# Patient Record
Sex: MALE | Race: WHITE | NOT HISPANIC OR LATINO | Employment: OTHER | URBAN - METROPOLITAN AREA
[De-identification: names, ages, dates, MRNs, and addresses within clinical notes are randomized per-mention and may not be internally consistent; named-entity substitution may affect disease eponyms.]

---

## 2023-04-05 LAB — SARS-COV-2 RESULT: NOT DETECTED

## 2023-04-06 ENCOUNTER — HOSPITAL ENCOUNTER (OUTPATIENT)
Dept: DATA CONVERSION | Facility: HOSPITAL | Age: 63
End: 2023-04-07
Attending: ORTHOPAEDIC SURGERY | Admitting: ORTHOPAEDIC SURGERY
Payer: COMMERCIAL

## 2023-04-06 DIAGNOSIS — Z96.659 PRESENCE OF UNSPECIFIED ARTIFICIAL KNEE JOINT: ICD-10-CM

## 2023-04-06 DIAGNOSIS — E78.5 HYPERLIPIDEMIA, UNSPECIFIED: ICD-10-CM

## 2023-04-06 DIAGNOSIS — M10.9 GOUT, UNSPECIFIED: ICD-10-CM

## 2023-04-06 DIAGNOSIS — I50.30 UNSPECIFIED DIASTOLIC (CONGESTIVE) HEART FAILURE (MULTI): ICD-10-CM

## 2023-04-06 DIAGNOSIS — Z79.01 LONG TERM (CURRENT) USE OF ANTICOAGULANTS: ICD-10-CM

## 2023-04-06 DIAGNOSIS — I11.0 HYPERTENSIVE HEART DISEASE WITH HEART FAILURE (MULTI): ICD-10-CM

## 2023-04-06 DIAGNOSIS — M17.11 UNILATERAL PRIMARY OSTEOARTHRITIS, RIGHT KNEE: ICD-10-CM

## 2023-04-06 DIAGNOSIS — Z79.84 LONG TERM (CURRENT) USE OF ORAL HYPOGLYCEMIC DRUGS: ICD-10-CM

## 2023-04-06 DIAGNOSIS — Z87.891 PERSONAL HISTORY OF NICOTINE DEPENDENCE: ICD-10-CM

## 2023-04-06 DIAGNOSIS — E11.51 TYPE 2 DIABETES MELLITUS WITH DIABETIC PERIPHERAL ANGIOPATHY WITHOUT GANGRENE (MULTI): ICD-10-CM

## 2023-04-06 DIAGNOSIS — K58.9 IRRITABLE BOWEL SYNDROME WITHOUT DIARRHEA: ICD-10-CM

## 2023-04-06 DIAGNOSIS — I48.20 CHRONIC ATRIAL FIBRILLATION, UNSPECIFIED (MULTI): ICD-10-CM

## 2023-04-06 LAB
POCT GLUCOSE: 137 MG/DL (ref 74–99)
POCT GLUCOSE: 145 MG/DL (ref 74–99)
POCT GLUCOSE: 165 MG/DL (ref 74–99)
POCT GLUCOSE: 209 MG/DL (ref 74–99)

## 2023-04-07 LAB
ANION GAP IN SER/PLAS: 12 MMOL/L (ref 10–20)
BASOPHILS (10*3/UL) IN BLOOD BY AUTOMATED COUNT: 0.04 X10E9/L (ref 0–0.1)
BASOPHILS/100 LEUKOCYTES IN BLOOD BY AUTOMATED COUNT: 0.4 % (ref 0–2)
CALCIUM (MG/DL) IN SER/PLAS: 8.8 MG/DL (ref 8.6–10.3)
CARBON DIOXIDE, TOTAL (MMOL/L) IN SER/PLAS: 27 MMOL/L (ref 21–32)
CHLORIDE (MMOL/L) IN SER/PLAS: 102 MMOL/L (ref 98–107)
CREATININE (MG/DL) IN SER/PLAS: 0.9 MG/DL (ref 0.5–1.3)
EOSINOPHILS (10*3/UL) IN BLOOD BY AUTOMATED COUNT: 0.01 X10E9/L (ref 0–0.7)
EOSINOPHILS/100 LEUKOCYTES IN BLOOD BY AUTOMATED COUNT: 0.1 % (ref 0–6)
ERYTHROCYTE DISTRIBUTION WIDTH (RATIO) BY AUTOMATED COUNT: 13.2 % (ref 11.5–14.5)
ERYTHROCYTE MEAN CORPUSCULAR HEMOGLOBIN CONCENTRATION (G/DL) BY AUTOMATED: 33.3 G/DL (ref 32–36)
ERYTHROCYTE MEAN CORPUSCULAR VOLUME (FL) BY AUTOMATED COUNT: 90 FL (ref 80–100)
ERYTHROCYTES (10*6/UL) IN BLOOD BY AUTOMATED COUNT: 3.73 X10E12/L (ref 4.5–5.9)
GFR MALE: >90 ML/MIN/1.73M2
GLUCOSE (MG/DL) IN SER/PLAS: 114 MG/DL (ref 74–99)
HEMATOCRIT (%) IN BLOOD BY AUTOMATED COUNT: 33.6 % (ref 41–52)
HEMOGLOBIN (G/DL) IN BLOOD: 11.2 G/DL (ref 13.5–17.5)
IMMATURE GRANULOCYTES/100 LEUKOCYTES IN BLOOD BY AUTOMATED COUNT: 0.5 % (ref 0–0.9)
LEUKOCYTES (10*3/UL) IN BLOOD BY AUTOMATED COUNT: 9.6 X10E9/L (ref 4.4–11.3)
LYMPHOCYTES (10*3/UL) IN BLOOD BY AUTOMATED COUNT: 0.72 X10E9/L (ref 1.2–4.8)
LYMPHOCYTES/100 LEUKOCYTES IN BLOOD BY AUTOMATED COUNT: 7.5 % (ref 13–44)
MONOCYTES (10*3/UL) IN BLOOD BY AUTOMATED COUNT: 1.03 X10E9/L (ref 0.1–1)
MONOCYTES/100 LEUKOCYTES IN BLOOD BY AUTOMATED COUNT: 10.8 % (ref 2–10)
NEUTROPHILS (10*3/UL) IN BLOOD BY AUTOMATED COUNT: 7.72 X10E9/L (ref 1.2–7.7)
NEUTROPHILS/100 LEUKOCYTES IN BLOOD BY AUTOMATED COUNT: 80.7 % (ref 40–80)
PLATELETS (10*3/UL) IN BLOOD AUTOMATED COUNT: 156 X10E9/L (ref 150–450)
POCT GLUCOSE: 110 MG/DL (ref 74–99)
POCT GLUCOSE: 178 MG/DL (ref 74–99)
POTASSIUM (MMOL/L) IN SER/PLAS: 4 MMOL/L (ref 3.5–5.3)
SODIUM (MMOL/L) IN SER/PLAS: 137 MMOL/L (ref 136–145)
UREA NITROGEN (MG/DL) IN SER/PLAS: 22 MG/DL (ref 6–23)

## 2023-04-08 LAB
ANION GAP IN SER/PLAS: NORMAL
BASOPHILS (10*3/UL) IN BLOOD BY AUTOMATED COUNT: NORMAL
BASOPHILS/100 LEUKOCYTES IN BLOOD BY AUTOMATED COUNT: NORMAL
CALCIUM (MG/DL) IN SER/PLAS: NORMAL
CARBON DIOXIDE, TOTAL (MMOL/L) IN SER/PLAS: NORMAL
CHLORIDE (MMOL/L) IN SER/PLAS: NORMAL
CREATININE (MG/DL) IN SER/PLAS: NORMAL
EOSINOPHILS (10*3/UL) IN BLOOD BY AUTOMATED COUNT: NORMAL
EOSINOPHILS/100 LEUKOCYTES IN BLOOD BY AUTOMATED COUNT: NORMAL
ERYTHROCYTE DISTRIBUTION WIDTH (RATIO) BY AUTOMATED COUNT: NORMAL
ERYTHROCYTE MEAN CORPUSCULAR HEMOGLOBIN CONCENTRATION (G/DL) BY AUTOMATED: NORMAL
ERYTHROCYTE MEAN CORPUSCULAR VOLUME (FL) BY AUTOMATED COUNT: NORMAL
ERYTHROCYTES (10*6/UL) IN BLOOD BY AUTOMATED COUNT: NORMAL
GFR FEMALE: NORMAL
GFR MALE: NORMAL
GLUCOSE (MG/DL) IN SER/PLAS: NORMAL
HEMATOCRIT (%) IN BLOOD BY AUTOMATED COUNT: NORMAL
HEMOGLOBIN (G/DL) IN BLOOD: NORMAL
IMMATURE GRANULOCYTES/100 LEUKOCYTES IN BLOOD BY AUTOMATED COUNT: NORMAL
LEUKOCYTES (10*3/UL) IN BLOOD BY AUTOMATED COUNT: NORMAL
LYMPHOCYTES (10*3/UL) IN BLOOD BY AUTOMATED COUNT: NORMAL
LYMPHOCYTES/100 LEUKOCYTES IN BLOOD BY AUTOMATED COUNT: NORMAL
MANUAL DIFFERENTIAL Y/N: NORMAL
MONOCYTES (10*3/UL) IN BLOOD BY AUTOMATED COUNT: NORMAL
MONOCYTES/100 LEUKOCYTES IN BLOOD BY AUTOMATED COUNT: NORMAL
NEUTROPHILS (10*3/UL) IN BLOOD BY AUTOMATED COUNT: NORMAL
NEUTROPHILS/100 LEUKOCYTES IN BLOOD BY AUTOMATED COUNT: NORMAL
NRBC (PER 100 WBCS) BY AUTOMATED COUNT: NORMAL
PLATELETS (10*3/UL) IN BLOOD AUTOMATED COUNT: NORMAL
POTASSIUM (MMOL/L) IN SER/PLAS: NORMAL
SODIUM (MMOL/L) IN SER/PLAS: NORMAL
UREA NITROGEN (MG/DL) IN SER/PLAS: NORMAL

## 2023-05-25 LAB
ANION GAP IN SER/PLAS: 14 MMOL/L (ref 10–20)
CALCIUM (MG/DL) IN SER/PLAS: 9.4 MG/DL (ref 8.6–10.3)
CARBON DIOXIDE, TOTAL (MMOL/L) IN SER/PLAS: 27 MMOL/L (ref 21–32)
CHLORIDE (MMOL/L) IN SER/PLAS: 98 MMOL/L (ref 98–107)
CREATININE (MG/DL) IN SER/PLAS: 0.74 MG/DL (ref 0.5–1.3)
GFR MALE: >90 ML/MIN/1.73M2
GLUCOSE (MG/DL) IN SER/PLAS: 162 MG/DL (ref 74–99)
POTASSIUM (MMOL/L) IN SER/PLAS: 3.9 MMOL/L (ref 3.5–5.3)
SODIUM (MMOL/L) IN SER/PLAS: 135 MMOL/L (ref 136–145)
UREA NITROGEN (MG/DL) IN SER/PLAS: 15 MG/DL (ref 6–23)

## 2023-06-01 ENCOUNTER — HOSPITAL ENCOUNTER (OUTPATIENT)
Dept: DATA CONVERSION | Facility: HOSPITAL | Age: 63
End: 2023-06-01
Attending: ORTHOPAEDIC SURGERY | Admitting: ORTHOPAEDIC SURGERY
Payer: COMMERCIAL

## 2023-06-01 DIAGNOSIS — E11.9 TYPE 2 DIABETES MELLITUS WITHOUT COMPLICATIONS (MULTI): ICD-10-CM

## 2023-06-01 DIAGNOSIS — Z79.84 LONG TERM (CURRENT) USE OF ORAL HYPOGLYCEMIC DRUGS: ICD-10-CM

## 2023-06-01 DIAGNOSIS — I48.0 PAROXYSMAL ATRIAL FIBRILLATION (MULTI): ICD-10-CM

## 2023-06-01 DIAGNOSIS — M17.11 UNILATERAL PRIMARY OSTEOARTHRITIS, RIGHT KNEE: ICD-10-CM

## 2023-06-01 DIAGNOSIS — I50.9 HEART FAILURE, UNSPECIFIED (MULTI): ICD-10-CM

## 2023-06-01 DIAGNOSIS — M25.661 STIFFNESS OF RIGHT KNEE, NOT ELSEWHERE CLASSIFIED: ICD-10-CM

## 2023-06-01 DIAGNOSIS — M24.661 ANKYLOSIS, RIGHT KNEE: ICD-10-CM

## 2023-06-01 DIAGNOSIS — Z96.651 PRESENCE OF RIGHT ARTIFICIAL KNEE JOINT: ICD-10-CM

## 2023-06-01 DIAGNOSIS — E78.5 HYPERLIPIDEMIA, UNSPECIFIED: ICD-10-CM

## 2023-06-01 DIAGNOSIS — Z79.01 LONG TERM (CURRENT) USE OF ANTICOAGULANTS: ICD-10-CM

## 2023-06-01 DIAGNOSIS — I11.0 HYPERTENSIVE HEART DISEASE WITH HEART FAILURE (MULTI): ICD-10-CM

## 2023-06-01 LAB — POCT GLUCOSE: 164 MG/DL (ref 74–99)

## 2023-09-07 VITALS
HEART RATE: 59 BPM | BODY MASS INDEX: 30.3 KG/M2 | TEMPERATURE: 98.4 F | DIASTOLIC BLOOD PRESSURE: 67 MMHG | SYSTOLIC BLOOD PRESSURE: 122 MMHG | HEIGHT: 70 IN | WEIGHT: 211.64 LBS | RESPIRATION RATE: 20 BRPM

## 2023-09-14 NOTE — PROGRESS NOTES
Consult Type: subsequent visit/care      Service: General Internal Medicine      Subjective Data:   YUNIOR PALOMO is a 62 year old Male who is Hospital Day # 2 and POD #1 for 1. RIGHT TOTAL KNEE REPLACEMENT;Jasvir robotic assisted computer navigated total knee replacement ;     Patient examined and seen. Alert and oriented x3, resting comfortably.  Patient denies chest pain, shortness of breath, palpitations, abdominal pain, fever or chills.  Patient is agreeable to go home  when cleared.  Reports support system is intact.    Objective Data:     Objective Information:      T   P  R  BP   MAP  SpO2   Value  36.6  51  18  153/80   111  97%  Date/Time 4/7 7:23 4/7 7:23 4/7 7:23 4/7 7:23  4/7 7:23 4/7 7:23  Range  (36C - 36.6C )  (51 - 78 )  (18 - 18 )  (145 - 172 )/ (80 - 85 )  (111 - 111 )  (95% - 97% )      Pain reported at 4/7 1:51: 4 = Moderate    Physical Exam Narrative:  ·  Physical Exam:    Constitutional: Well developed, awake/alert/oriented x3, cooperative  Respiratory/Thorax: Patent airways,  normal breath sounds   Cardiovascular: Regular, rate and rhythm, no murmurs, normal S 1and S 2  Gastrointestinal: Nondistended, soft, non-tender,   Musculoskeletal: mild decrease range of motion to right knee s/p sx intervention, good capillary refills bilaterally, +2 pulses, good sensation   Extremities: normal extremities,  incision covered with splint, immobilizer  Skin: warm, dry, intact  Neurological: alert/oriented x 3, speech clear  Psychiatric: appropriate mood and behavior        Medication:    Medications:      CARDIOVASCULAR AGENTS:    1. Carvedilol:  6.25  mg  Oral  2 Times a Day    2. amLODIPine (NORVASC):  10  mg  Oral  Daily      CENTRAL NERVOUS SYSTEM AGENTS:    1. Acetaminophen:  650  mg  Oral  Every 6 Hours    2. Ketorolac Injectable:  15  mg  IntraVenous Push  Every 6 Hours    3. Morphine Injectable:  2  mg  IntraVenous Push  Every 4 Hours   PRN       4. oxyCODONE Immediate Release:  7.5  mg   Oral  Every 4 Hours   PRN       5. oxyCODONE Immediate Release:  2.5  mg  Oral  Every 4 Hours   PRN       6. oxyCODONE Immediate Release:  5  mg  Oral  Every 4 Hours   PRN       7. Ondansetron Injectable:  4  mg  IntraVenous Push  Every 6 Hours   PRN       8. Cyclobenzaprine:  10  mg  Oral  3 Times a Day   PRN         COAGULATION MODIFIERS:    1. Rivaroxaban:  10  mg  Oral  Daily      GASTROINTESTINAL AGENTS:    1. Magnesium Hydroxide -Al Hydrox -Simethicone Oral Liquid:  30  mL  Oral  Every 6 Hours   PRN       2. Docusate:  100  mg  Oral  2 Times a Day    3. Polyethylene Glycol:  17  gram(s)  Oral  2 Times a Day    4. Pantoprazole:  40  mg  Oral  Daily      METABOLIC AGENTS:    1. Insulin Lispro Moderate Corrective Scale:  unit(s)  SubCutaneous  4 Times a Day Insulin Timing    2. Allopurinol:  100  mg  Oral  Every 24 Hours    3. Atorvastatin:  40  mg  Oral  At Bedtime    4. Dextrose 50% in Water Injectable:  25  gram(s)  IntraVenous Push  Every 15 Minutes   PRN       5. Glucagon Injectable:  1  mg  IntraMuscular  Every 15 Minutes   PRN         NUTRITIONAL PRODUCTS:    1. Lactated Ringers Infusion:  1000  mL  IntraVenous  <Continuous>    2. Lactated Ringers Infusion:  1000  mL  IntraVenous  <Continuous>      RESPIRATORY AGENTS:    1. diphenhydrAMINE Injectable:  12.5  mg  IntraVenous Push  Every 6 Hours   PRN       2. Albuterol 90 micrograms/ Inhalation MDI:  2  inhalation  Inhalation  Every 6 Hours   PRN           Recent Lab Results:    Results:        I have reviewed these laboratory results:    Glucose_POCT  Trending View      Result 07-Apr-2023 10:55:00  07-Apr-2023 07:35:00  06-Apr-2023 21:11:00  06-Apr-2023 16:15:00  06-Apr-2023 13:14:00  06-Apr-2023 09:29:00    Glucose-POCT 178   H   110   H   209   H   165   H   145   H   137   H        Complete Blood Count + Differential  07-Apr-2023 04:50:00      Result Value    White Blood Cell Count  9.6    Red Blood Cell Count  3.73   L   HGB  11.2   L   HCT  33.6   L    MCV  90    MCHC  33.3    PLT  156    RDW-CV  13.2    Neutrophil %  80.7    Immature Granulocytes %  0.5    Lymphocyte %  7.5    Monocyte %  10.8    Eosinophil %  0.1    Basophil %  0.4    Neutrophil Count  7.72   H   Lymphocyte Count  0.72   L   Monocyte Count  1.03   H   Eosinophil Count  0.01    Basophil Count  0.04      Basic Metabolic Panel  07-Apr-2023 04:50:00      Result Value    Glucose, Serum  114   H   NA  137    K  4.0    CL  102    Bicarbonate, Serum  27    Anion Gap, Serum  12    BUN  22    CREAT  0.90    GFR Male  >90    Calcium, Serum  8.8        Radiology Results:    Results:        Impression:    1. Immediate postsurgical changes of right knee arthroplasty without  hardware complication.     Xray Knee 1 or 2 View [Apr 6 2023  2:17PM]        Assessment and Plan:   Code Status:  ·  Code Status Full Code     Assessment:    Hospitalist team consulted for postoperative management of hypertension and diabetes.    # Right Knee Arthoplasty  Right Knee Pain  Orthopedic Team Primary   Pain and DVT Prophylaxis per Ortho team  PT/OT treatment evaluation  Fall precautions   Incentive spirometer education and demonstration addressed   Discharge planning  CBC BMP  as to be expected post op  Vital signs every 8    # HTN / HLD / PAF / Diastolic Heart Failure with LVEF 64%  Continue home medications   Hold Lisinopril until discharge  Continue Statin   Continue Coreg  Telemetry monitoring for arrhythmia - reviewed, NSR HR 66 with PVCs   Xeralto - defer to Orthopedics Team     # GOUT  continue allopurinol    # Diabetes Mellitus   Accu Checks with SSI   Diabetic/Cardiac diet  Healthy Lifestyle encouraged    Thank you for consult  Hemodynamically stable  Discharge later today per Orthopedics     Plan of care was discussed extensively with patient, RN and Ortho NP. Patient verbalized understanding through teach back method. All questions and concerns addressed upon examination.     ***Of note, this documentation is  completed using the Dragon Dictation system (voice recognition software). There may be spelling and/or grammatical errors that were not corrected prior to final submission.***               Plan of Care Reviewed With:  Plan of Care Reviewed With: patient       Electronic Signatures:  Lois Cullen ([facility]-CNP)  (Signed 07-Apr-2023 11:16)   Authored: Service, Subjective Data, Objective Data, Assessment  and Plan, Note Completion      Last Updated: 07-Apr-2023 11:16 by Lois Cullen (APRN-CNP)

## 2023-09-14 NOTE — H&P
History & Physical Reviewed:   I have reviewed the History and Physical dated:  06-Apr-2023   History and Physical reviewed and relevant findings noted. Patient examined to review pertinent physical  findings.: No significant changes   Home Medications Reviewed: no changes noted   Allergies Reviewed: no changes noted     Consent:   COVID-19 Consent:  ·  COVID-19 Risk Consent Surgeon has reviewed key risks related to the risk of kavon COVID-19 and if they contract COVID-19 what the risks are.       Electronic Signatures:  Napoleon Frazier)  (Signed 06-Apr-2023 07:37)   Authored: History & Physical Reviewed, Consent, Note  Completion      Last Updated: 06-Apr-2023 07:37 by Napoleon Frazier)

## 2023-09-14 NOTE — DISCHARGE SUMMARY
Send Summary:   Discharge Summary Providers:  Provider Role Provider Name   · Referring Madeleine Ruffin   · Attending Napoleon Frazier   · Consulting Johan Ortiz   · Primary Madeleine Ruffin       Note Recipients: Madeleine Ruffin MD Stanfield, William, MD - [phone] [Preferred]       Discharge:    Summary:   Admission Date: .06-Apr-2023 07:18:00   Discharge Date: 07-Apr-2023   Attending Physician at Discharge: Napoleon Frazier   Admission Reason: Right Knee Arthoplasty (1)   Final Discharge Diagnoses: Status post total knee  replacement   Procedures: Date: 06-Apr-2023 12:25:00  Procedure Name: 1. RIGHT TOTAL KNEE REPLACEMENT  Jasvir robotic assisted computer navigated total knee replacement   Condition at Discharge: Satisfactory   Disposition at Discharge: .Home   Vital Signs:        T   P  R  BP   MAP  SpO2   Value  36.6  51  18  153/80   111  97%  Date/Time 4/7 7:23 4/7 7:23 4/7 7:23 4/7 7:23  4/7 7:23 4/7 7:23  Range  (36C - 36.6C )  (51 - 78 )  (18 - 18 )  (145 - 172 )/ (80 - 85 )  (111 - 111 )  (95% - 97% )    Date:            Weight/Scale Type:  Height:   06-Apr-2023 09:07  98.5  kg / standing       Hospital Course:                                                  Discharge summary  This patient  was admitted to the hospital on 4/6/23 after undergoing TKA without complications that morning.    During the postoperative period, while in hospital, patient was medically managed by the hospitalist. Please see medial notes and H&P for patients additional diagnoses.  Ortho agrees with all medical diagnoses and treatments while patient in hospital.  No significant or unexpected findings or abnormal ortho imaging were noted during the hospital stay  Hospital course  Patient tolerated surgical procedure well and there was no complications. Patient progressed adequately through their recovery during hospital stay including PT and rehabilitation.  DVT prophylaxis was implemented POD#1  Patient was then D/C to  Home in stable condition.    Patient was instructed on the use of pain medications, the signs and symptoms of infection, and was given our number to call should they have any questions or concerns following discharge.          Discharge Information:    and Continuing Care:   Lab Results - Pending:    None  Radiology Results - Pending: None   Discharge Instructions:    Activity:           activity as tolerated.          May shower..            May not return to school/work.            May not drive.            Weight-bearing Instructions: full weight bearing.  per total knee    Nutrition/Diet:           resume normal diet    Wound Care:           Wound Site:   right knee          Change Dressin time   peel off rubber dressing on 23    Additional Orders:           Additional Instructions:                                                        Total Knee Replacement                                                                                                    Discharge Instructions    To prevent Clot formation, you have been placed on the following medication:    back on you xarelto                                               Surgical Site Care:    Change dressing once a day and PRN (as needed).     Apply 4 x 4 sponge and light tape.     If glue present, leave open to air.    You may leave wound open to air after initial dressing removal, if wound is clean, dry and intact     Aquacel Ag dressing is present, do not remove dressing for 7 days, unless heavily saturated.    If heavily saturated, remove dressing and start using instructions above     Staples will be removed on post-operative day 14 and steri-strips applied Showering is permitted starting post op day 1 if waterproof Aquacel dressing is present or when the incision is covered with 4 x 4 and Tegaderm waterproof dressing     Until all areas of incision are healed.                                                 Physical Therapy:        Weight  Bearing Status:  Weight bear as tolerated     total knee- protocol                                                                                                                            Pain Medications        You were given narcotics     Wean off pain medications as you deem appropriate as long as pain is under control Cold packs/Ice packs/Machine May be used every hour for 15-30 minutes as tolerated Be sure to have a barrier (cloth, clothing, towel) between the site an the ice pack to  prevent frostbite.    incentive spirometer 10 x every hour while awake for 2 weeks     surgical teds x3 weeks- removing only for skin care and hygiene     IF INCISION STARTS TO DRAIN CALL OFFICE RIGHT A WAY    Contact [facility] office if    Increased redness, swelling, drainage of any kind, and/or pain to surgery site. As well as new onset fevers and or chills. These could signify an infection. Calf or thigh tenderness to touch as well as increased swelling or redness. This could signify  a clot formation. Numbness or tingling to an area around the incision site or below the incision site (toes).Any rash appears, increased or new onset nausea/vomiting occur. This may indicate a reaction to a medication.    Phone # [phone].                                    Follow up with Surgeon in 2 weeks or as scheduled by the office     Home Care Certification:           Home Care Agency:    [facility] [phone]          Skilled Disciplines Ordered:   PT,  OT    Home Care Services:           Home Care Skilled Service:   Rehab (PT/OT/SP eval and treat),  wound care    Follow Up Appointments:    Follow-Up Appointment 01:           Physician/Dept/Service:   Dr Frazier          Reason for Referral:   right knee- post op          Call to Schedule in:   2 weeks,  or as already scheduled by the office          Location:   [address] [facility]          Phone Number:   [phone]    Discharge  "Medications: Home Medication   Ginger Root oral capsule - 1 cap(s) orally once a day, patient aware to hold  Align 4 mg oral capsule - 1 cap(s) orally once a day  allopurinol 100 mg oral tablet - 1 tab(s) orally once a day  metFORMIN 500 mg oral tablet - 2 tab(s) orally once a day in AM  metFORMIN 500 mg oral tablet - 1 tab(s) orally once a day (at bedtime)  atorvastatin 40 mg oral tablet - 1 tab(s) orally once a day  amLODIPine 10 mg oral tablet - 1 tab(s) orally once a day  lisinopril 20 mg oral tablet - 1 tab(s) orally once a day  hydroCHLOROthiazide 25 mg oral tablet - 1 tab(s) orally once a day  carvedilol 6.25 mg oral tablet - 1 tab(s) orally 2 times a day, patient aware to take AM of procedure  magnesium oxide 250 mg oral tablet - 1 tab(s) orally once a day  Osteo Bi-Flex 250 mg-200 mg oral tablet - 1 tab(s) orally once a day  Xarelto 20 mg oral tablet - 1 tab(s) orally once a day (in the evening),   docusate sodium 100 mg oral capsule - 1 cap(s) orally 2 times a day     PRN Medication   cyclobenzaprine 10 mg oral tablet - 1 tab(s) orally 3 times a day, As needed, Muscle Spasms. if a whole pill makes you too tired- please take half  oxyCODONE 5 mg oral tablet - 1 tab(s) orally every 4 hours, As needed, Pain - Mod (4-6)  Albuterol (Eqv-ProAir HFA) 90 mcg/inh inhalation aerosol - 2 puff(s) inhaled every 6 hours, As Needed     DNR Status:   ·  Code Status Code Status order at time of discharge: Full Code       Electronic Signatures:  Yudy Kirk ([facility]-CNP)  (Signed 07-Apr-2023 09:07)   Authored: Send Summary, Summary Content, Ongoing Care,  DNR Status, Note Completion      Last Updated: 07-Apr-2023 09:07 by Yudy Kirk (APRN-CNP)    References:  1.  Data Referenced From \"Consult-General Internal Medicine\" 06-Apr-2023 16:20   "

## 2023-09-14 NOTE — PROGRESS NOTES
Service: Orthopaedics     Subjective Data:   YUNIOR PALOMO is a 62 year old Male who is Hospital Day # 2 and POD #1 for 1. RIGHT TOTAL KNEE REPLACEMENT;Jasvir robotic assisted computer navigated total knee replacement ;    Objective Data:     Objective Information:      T   P  R  BP   MAP  SpO2   Value  36.6  51  18  153/80   111  97%  Date/Time 4/7 7:23 4/7 7:23 4/7 7:23 4/7 7:23  4/7 7:23 4/7 7:23  Range  (36C - 36.6C )  (51 - 78 )  (18 - 18 )  (145 - 172 )/ (80 - 85 )  (111 - 111 )  (95% - 97% )      Pain reported at 4/7 1:51: 4 = Moderate      T   P  R  BP   MAP  SpO2   Value  36.6  51  18  153/80   111  97%  Date/Time 4/7 7:23 4/7 7:23 4/7 7:23 4/7 7:23  4/7 7:23 4/7 7:23  Range  (36C - 36.6C )  (51 - 78 )  (18 - 18 )  (145 - 172 )/ (80 - 85 )  (111 - 111 )  (95% - 97% )        Pain reported at 4/7 1:51: 4 = Moderate    Medication:    Medications:          Continuous Medications       --------------------------------    1. Lactated Ringers Infusion:  1000  mL  IntraVenous  <Continuous>    2. Lactated Ringers Infusion:  1000  mL  IntraVenous  <Continuous>         Scheduled Medications       --------------------------------    1. Acetaminophen:  650  mg  Oral  Every 6 Hours    2. Allopurinol:  100  mg  Oral  Every 24 Hours    3. amLODIPine (NORVASC):  10  mg  Oral  Daily    4. Atorvastatin:  40  mg  Oral  At Bedtime    5. Carvedilol:  6.25  mg  Oral  2 Times a Day    6. Docusate:  100  mg  Oral  2 Times a Day    7. Insulin Lispro Moderate Corrective Scale:  unit(s)  SubCutaneous  4 Times a Day Insulin Timing    8. Ketorolac Injectable:  15  mg  IntraVenous Push  Every 6 Hours    9. Pantoprazole:  40  mg  Oral  Daily    10. Polyethylene Glycol:  17  gram(s)  Oral  2 Times a Day    11. Rivaroxaban:  10  mg  Oral  Daily         PRN Medications       --------------------------------    1. Albuterol 90 micrograms/ Inhalation MDI:  2  inhalation  Inhalation  Every 6 Hours    2. Cyclobenzaprine:  10  mg  Oral  3  Times a Day    3. Dextrose 50% in Water Injectable:  25  gram(s)  IntraVenous Push  Every 15 Minutes    4. diphenhydrAMINE Injectable:  12.5  mg  IntraVenous Push  Every 6 Hours    5. Glucagon Injectable:  1  mg  IntraMuscular  Every 15 Minutes    6. Magnesium Hydroxide -Al Hydrox -Simethicone Oral Liquid:  30  mL  Oral  Every 6 Hours    7. Morphine Injectable:  2  mg  IntraVenous Push  Every 4 Hours    8. Ondansetron Injectable:  4  mg  IntraVenous Push  Every 6 Hours    9. oxyCODONE Immediate Release:  2.5  mg  Oral  Every 4 Hours    10. oxyCODONE Immediate Release:  5  mg  Oral  Every 4 Hours    11. oxyCODONE Immediate Release:  7.5  mg  Oral  Every 4 Hours        Recent Lab Results:    Results:    CBC: 4/7/2023 04:50              \     Hgb     /                              \     11.2 L    /  WBC  ----------------  Plt               9.6       ----------------    156              /     Hct     \                              /     33.6 L    \            RBC: 3.73 L    MCV: 90     Neutrophil %: 80.7      BMP: 4/7/2023 04:50  NA+        Cl-     BUN  /                         137    102    22  /  --------------------------------  Glucose                ---------------------------  114 H    K+     HCO3-   Creat \                         4.0  27    0.90  \  Calcium : 8.8     Anion Gap : 12        I have reviewed these laboratory results: Complete Blood Count + Differential [Drawn 07-Apr-2023 04:50:00], Basic Metabolic Panel [Drawn 07-Apr-2023 04:50:00].    Assessment and Plan:        Admitting Dx:   Status post total knee replacement: Entered Date: 06-Apr-2023  11:37    Code Status:  ·  Code Status Full Code     Assessment:    Assessment    pt is doing really good.  able to move around, has been able to ambulate and use the restroom, pain is well controlled, denies any nausea vomiting, SOB or chest pain. doing. pt's incision is clean dry and intact and area is soft. pt is doing  good and  states he is ready to  go home.     PLAN   PT/ OT and continued mobility   Home with HHC   has a walker   DVT prophylaxis    pain meds      Electronic Signatures:  Subhash Sadler ()   (Signed 07-Apr-2023 10:24)   Co-Signer: Service, Assessment/Plan Review, Subjective Data, Objective Data, Assessment and Plan, Note Completion  Yudy Kirk (APRN-CNP)   (Signed 07-Apr-2023 09:02)   Authored: Service, Assessment/Plan Review, Subjective Data, Objective Data, Assessment and Plan, Note Completion    Last Updated: 07-Apr-2023 10:24 by Subhash Sadler ()

## 2023-09-30 NOTE — H&P
History & Physical Reviewed:   I have reviewed the History and Physical dated:  01-Jun-2023   History and Physical reviewed and relevant findings noted. Patient examined to review pertinent physical  findings.: No significant changes   Home Medications Reviewed: no changes noted   Allergies Reviewed: no changes noted     Consent:   COVID-19 Consent:  ·  COVID-19 Risk Consent Surgeon has reviewed key risks related to the risk of kavon COVID-19 and if they contract COVID-19 what the risks are.       Electronic Signatures:  Napoleon Frazier)  (Signed 01-Jun-2023 06:34)   Authored: History & Physical Reviewed, Consent, Note  Completion      Last Updated: 01-Jun-2023 06:34 by Napoleon Frazier)

## 2023-09-30 NOTE — H&P
History of Present Illness:   History Present Illness:  Reason for surgery: Right knee stiffness   HPI:    61 y/o male with acute surgical history of Right TKA continues to have difficulty with ROM, especially flexion. He complains of difficulty with getting in and out  of the car due to lack of ROM. He has pain and tightness with getting up from seated positions. He continues with PT with very little progression.     Allergies:        Allergies:  ·  No Known Allergies :     Home Medication Review:   Home Medications Reviewed: yes     Impression/Procedure:   ·  Impression and Planned Procedure: Right knee arthrofibrosis; Right knee RICH       ERAS (Enhanced Recovery After Surgery):  ·  ERAS Patient: no     Review of Systems:   Review of Systems:  Constitutional: NEGATIVE: Fever, Chills, Malaise     Eyes: NEGATIVE: Blurry Vision, Redness     ENMT: NEGATIVE: Nasal Discharge, Mouth Pain     Respiratory: NEGATIVE: Dry Cough, Wheezing, Shortness  of Breath     Cardiac: NEGATIVE: Chest Pain, Palpitations     Gastrointestinal: NEGATIVE: Nausea, Vomiting, Abdominal  Pain     Musculoskeletal: POSITIVE: Decreased ROM, Pain, Swelling, Stiffness ; NEGATIVE: Weakness     Skin: NEGATIVE: Pruritus, Rash         Vital Signs:  Temperature C: 36.9 degrees C   Temperature F: 98.4 degrees F   Heart Rate: 59 beats per minute   Respiratory Rate: 20 breath per minute   Blood Pressure Systolic: 122 mm/Hg   Blood Pressure Diastolic: 67 mm/Hg     Physical Exam by System:    Constitutional: Well developed, awake/alert/oriented  x3, no distress, alert and cooperative   Eyes: PERRL, EOMI, clear sclera   ENMT: mucous membranes moist, no apparent injury,  no lesions seen   Head/Neck: Neck supple, no apparent injury, thyroid  without mass or tenderness, No JVD, trachea midline, no bruits   Respiratory/Thorax: Patent airways, CTAB, normal  breath sounds with good chest expansion, thorax symmetric   Cardiovascular: Regular, rate and rhythm, no  murmurs,  2+ equal pulses of the extremities, normal S 1and S 2   Gastrointestinal: Nondistended, soft, non-tender,  no rebound tenderness or guarding, no masses palpable, no organomegaly, +BS, no bruits   Musculoskeletal: Right knee is NVI. No infection.  No deformity or instability. TTP IT band and quad tendon. No defect. No DVT. Mild edema. Decreased ROM.   Neurological: alert and oriented x3, intact senses,  motor, response and reflexes, normal strength   Lymphatic: No significant lymphadenopathy   Psychological: Appropriate mood and behavior   Skin: Warm and dry, no lesions, no rashes     Consent:   COVID-19 Consent:  ·  COVID-19 Risk Consent Surgeon has reviewed key risks related to the risk of kavon COVID-19 and if they contract COVID-19 what the risks are.     Attestation:   Note Completion:  I am a:  Advanced Practice Provider   Attending Only - Shared Visit with Advanced Practice Provider This is a shared visit.  I have reviewed the Advanced Practice Provider?s encounter note, approve the Advanced Practice Provider?s documentation,  and provide the following additional information from my personal encounter.    Comments/ Additional Findings    In a face-to-face encounter, I performed a history and physical examination, discussed pertinent diagnostic studies if indicated, and discussed diagnosis  and management strategies with both the patient and the mid-level provider. I reviewed the mid levels note and agree with the documented findings and plan of care.  Over 50% of the evaluation decision making and treatment plan were performed by the physician.           Electronic Signatures:  Aakash Bhandari (KRISHAN)  (Signed 01-Jun-2023 07:21)   Authored: History of Present Illness, Allergies, Home  Medication Review, Impression/Procedure, ERAS, Review of Systems, Physical Exam, Consent, Note Completion  Napoleon Frazier)  (Signed 02-Jun-2023 06:49)   Authored: Note Completion   Co-Signer: History of Present  Illness, Allergies, Home Medication Review, Impression/Procedure, ERAS, Review of Systems, Physical Exam,  Consent, Note Completion      Last Updated: 02-Jun-2023 06:49 by Napoleon Frazier)

## 2023-10-02 NOTE — OP NOTE
PROCEDURE DETAILS    Preoperative Diagnosis:  Ankylosis of right knee    Postoperative Diagnosis:  same  Surgeon: Napoleon Frazier  Resident/Fellow/Other Assistant: Aakash Bhandari    Procedure:  1. RIGHT KNEE MANIPULATION UNDER ANESTHESIA    Anesthesia: No anesthesiologist associated with this case  Estimated Blood Loss: 0  Findings: arthro        Operative Report:   Preoperative diagnosis arthrofibrosis  knee status post  total knee replacement right  Postoperative diagnosis same  Procedure manipulation  total knee replacement    Anesthesia nerve block with general  Blood loss 0    Primary surgeon Napoleon Frazier M.D.    Clinical note  Patient continuing to have persistent stiffness status post total knee replacement now presents for knee manipulation  Nonoperative and operative treatment options were discussed. The risks, benefits, alternatives, and possible complications of the procedure were discussed with the patient including but not limited to infection, bleeding, injury to nerves and surrounding  structures, and anesthesia risks. Additional possibilities of , need for further surgery, and loss of life or limb. All questions were answered. The patient indicated an understanding of these risks and benefits and consented to the procedure.     Procedure  Patient was taken the operating room placed on table supine position where upon adequate general anesthesia was then obtained. Surgical timeout was then performed.    Preoperative range of motion was then measured at 0-80  A gentle manipulation was then performed with manual pressure.  Post manipulation range of motion was 0-110    Patient tolerated procedure well was taken to postanesthesia care unit in good condition without complication    This note was prepared using voice recognition software.  The details of this note are correct and have been reviewed, and corrected to the best of my ability.  Some grammatical areas may persist related to the  Dragon software    Napoleon Frazier MD    [phone]                        Attestation:   Note Completion:  Attending Attestation I performed the procedure without a resident         Electronic Signatures:  Napoleon Frazier)  (Signed 01-Jun-2023 07:44)   Authored: Post-Operative Note, Chart Review, Note Completion      Last Updated: 01-Jun-2023 07:44 by Napoleon Frazier)

## 2023-10-02 NOTE — OP NOTE
PROCEDURE DETAILS    Preoperative Diagnosis:  Osteoarthritis of right knee, M17.11    Postoperative Diagnosis:  Osteoarthritis Knee   Surgeon: Napoleon Frazier  Resident/Fellow/Other Assistant: Caroline Bedolla    Procedure:  1. RIGHT TOTAL KNEE REPLACEMENT  Jasvir robotic assisted computer navigated total knee replacement     Anesthesia: No anesthesiologist associated with this case  Estimated Blood Loss: min  Findings: Osteoarthritis Knee         Operative Report:   Preop diagnosis is DJD right knee  Postoperative diagnosis is same  Procedure total knee replacement using the Hellotravel computer assisted robotic-assisted knee replacement system    Anesthesia spinal with nerve block  EBL minimal    Implants [place]  Tibial component size5  Femoral component size6 CR  Patella size32  Insert size13 CS    Primary surgeon Napoleon Frazier  Assisting surgeon Domi bedolla PA certified      Pre-operative alignment 2 degree flexion contracture 4 degrees varus  Post-operative alignment 2 degree flexion contracture 2 degrees varus      This patient has progressive knee pain which has been refractory to conservative treatment.  The patient has decided to undergo elective total knee replacement.  The risks, benefits, outcomes and postoperative course were fully explained to the patient.   We discussed wear, loosening, blood clot infection, nerve damage, numbness and tingling, failure of the procedure, stiffness, loss of life, loss of limb, and the need for possible revision surgery.  The patient understands the procedure and consents  to surgical intervention.    The patient has pain in the knee that is increased with activity and weightbearing, and walks with an antalgic gait.  The pain is interfering with activities of daily living.  The patient has limited range of motion and pain with passive range of motion.   X-rays demonstrate joint space narrowing, subchondral sclerosis and osteophyte formation.  Symptoms are not  improving with medication, therapy or supportive device for a period of at least 3 months.    The physician assistant was present through the entire case.  Given the nature of the disease process and the procedure to be performed skilled surgical first assistant was necessary during the case.  The assistant was necessary to hold retractors and  manipulate the extremity during the procedure.  A certified scrub tech was at the back table managing the instruments and supplies for the surgical case.    Patient was taken to the operating room placed on the table in the supine position where upon adequate anesthesia was obtained  A tourniquet was applied to the lower extremity  The patient was then prepped and draped in the usual sterile manner the leg was then exsanguinated using an Esmarch bandage and the tourniquet was inflated to 250 mmHg  A surgical timeout was performed  A linear midline incision was made through the skin and subcutaneous tissues using sharp and blunt dissection.  A medial parapatellar arthrotomy was then performed and the patella was everted.  The patellar fat pad was then excised.  The femoral array  and femoral checkpoint were then inserted in standard technique.  And through 2 small stab incisions the tibial array was placed in the tibial checkpoints was applied.  Hip center was then identified by rotating the lower extremity.  Medial and lateral malleolar checkpoints performed.  At this point femoral mapping was performed using the blunt probe and accuracy was confirmed and verified.  Tibial mapping was then performed  with the patella was well and accuracy was confirmed.  Pose capture was then performed both extension and flexion.  Flexion and extension gaps were assessed and adjusted appropriately.  Once we confirmed flexion and extension gaps the cut sequence was verified and confirmed.  The robot was then introduced into the field the sawblade was verified self-retaining retractors were  placed and the femoral cuts were then performed.  Tibial cut was completed as well and all bony surfaces were removed without difficulty meniscal remnants  were excised  Tibial trial was then placed on the tibial bone surface to assess for appropriate sizing.  Tibial component rotation was confirmed using the green probe.   The femoral trial was applied as was the polyethylene insert.  The knee was placed in flexion and  extension,  stability was assessed throughout using the robotic system as well as manual tension.  Patellar reaming was then performed and peg holes were drilled for the patella and the patella trial was applied.  Patellofemoral tracking was assessed  and once we confirmed stability and patellofemoral tracking the tibial component was pinned in position.  The remaining trials were removed and the tibial bone surface was completed using the cruciate punch all bony surfaces were then irrigated with a  copious amount of pulsatile irrigation.  Cement was mixed on the back table using vacuum technique  Components were then inserted and completely seated without difficulty  A [place] elevator was used to remove any remaining cement and the knee was placed in full extension while the cement hardened  The knee joint was then irrigated with a copious pulsatile irrigation.  Joint capsule was repaired using interrupted #1 Vicryl suture.  3-0 Monocryl suture was used for the underlying subcutaneous tissue and 4-0 Monocryl for the skin.  A dry sterile bulky dressing was applied checkpoints and array were removed prior to closure  Patient was taken to the postanesthesia care unit in good condition postoperative x-rays were reviewed and findings the procedure were discussed with the patient's family    This note was prepared using voice recognition software.  The details of this note are correct and have been reviewed, and corrected to the best of my ability.  Some grammatical areas may persist related to the Dragon  software    Napoleon Frazier MD    [phone]                        Attestation:   Note Completion:  Attending Attestation I performed the procedure without a resident         Electronic Signatures:  Napoleon Frazier)  (Signed 06-Apr-2023 12:27)   Authored: Post-Operative Note, Chart Review, Note Completion      Last Updated: 06-Apr-2023 12:27 by Napoleon Frazier)

## 2024-01-02 ENCOUNTER — ANCILLARY PROCEDURE (OUTPATIENT)
Dept: RADIOLOGY | Facility: CLINIC | Age: 64
End: 2024-01-02
Payer: MEDICARE

## 2024-01-02 ENCOUNTER — OFFICE VISIT (OUTPATIENT)
Dept: ORTHOPEDIC SURGERY | Facility: CLINIC | Age: 64
End: 2024-01-02
Payer: MEDICARE

## 2024-01-02 DIAGNOSIS — Z96.651 STATUS POST TOTAL RIGHT KNEE REPLACEMENT: ICD-10-CM

## 2024-01-02 PROCEDURE — 73562 X-RAY EXAM OF KNEE 3: CPT | Mod: RT

## 2024-01-02 PROCEDURE — 99213 OFFICE O/P EST LOW 20 MIN: CPT | Performed by: ORTHOPAEDIC SURGERY

## 2024-01-02 PROCEDURE — 73562 X-RAY EXAM OF KNEE 3: CPT | Mod: RIGHT SIDE | Performed by: ORTHOPAEDIC SURGERY

## 2024-01-02 NOTE — PROGRESS NOTES
Subjective    Patient ID: Nahun    Chief Complaint:   Chief Complaint   Patient presents with    Right Knee - Follow-up     RICH 6/1/23     History of present illness    63-year-old gentleman presented clinic today for his 9-month postop follow-up visit status post right total knee replacement.  He had a manipulation done back in June.  Overall doing okay.  He continues to show signs of improvement with range of motion is just coming slowly.  He is continuing to work on the bike for exercise and continuing to work on stairs.  He states that his range of motion has improved enough to where he can do stairs without many issues.  No numbness tingling no fevers chills reported.  He is now under treatment with a spine specialist as well for lumbar radiculopathy.      Past medical , Surgical, Family and social history reviewed.      Physical exam  General: No acute distress and breathing comfortably.  Patient is pleasant and cooperative with the examination.    Extremity  Right knee is neurovascular intact.  Range of motion 0 to 105 degrees.  Mild edema of the right knee.  No instability deformity or infection.  Mild tightness over the IT band and posterior lateral hamstring.  Incision is well-healed without abnormality.  Compartments soft.  Improving strength.    Diagnostics  [ none]  XR knee right 3 views    Result Date: 1/2/2024  Interpreted By:  Lulu Frazier, STUDY: XR KNEE RIGHT 3 VIEWS;  ;  1/2/2024 1:28 pm   INDICATION: Signs/Symptoms:PAIN.   ACCESSION NUMBER(S): [id number]   ORDERING CLINICIAN: LULU FRAZIER   FINDINGS: Three views of the knee show status post joint replacement in good alignment.  There are no signs of fracture or dislocation.  No other bony abnormalities       Signed by: Lulu Frazier 1/2/2024 1:34 PM Dictation workstation:   [id number]       Procedure  [ none]    Assessment  Status post right total knee replacement    Treatment plan  1.  This time he will continue with  weightbearing activity as tolerated.  2.  Continue with overall range of motion and myofascial massage work which was discussed today.  3.  Follow-up with us in 3 months for reevaluation for his 1 year postop visit without x-ray.  4.  All of the patient's questions were answered.    This note was prepared using voice recognition software.  The details of this note are correct and have been reviewed, and corrected to the best of my ability.  Some grammatical areas may persist related to the Dragon software    Aakash Bhandari PA-C, [facility]  [facility] [place]    [phone]

## 2024-02-27 ENCOUNTER — OFFICE VISIT (OUTPATIENT)
Dept: ORTHOPEDIC SURGERY | Facility: CLINIC | Age: 64
End: 2024-02-27
Payer: MEDICARE

## 2024-02-27 DIAGNOSIS — Z96.651 STATUS POST TOTAL RIGHT KNEE REPLACEMENT: Primary | ICD-10-CM

## 2024-02-27 DIAGNOSIS — M17.11 PRIMARY OSTEOARTHRITIS OF RIGHT KNEE: ICD-10-CM

## 2024-02-27 PROCEDURE — 99213 OFFICE O/P EST LOW 20 MIN: CPT | Performed by: PHYSICIAN ASSISTANT

## 2024-02-27 RX ORDER — METHYLPREDNISOLONE 4 MG/1
TABLET ORAL
Qty: 21 TABLET | Refills: 0 | Status: SHIPPED | OUTPATIENT
Start: 2024-02-27

## 2024-02-28 NOTE — PROGRESS NOTES
Subjective    Patient ID: Nahun    Chief Complaint:   Chief Complaint   Patient presents with    Right Knee - Follow-up     RT KNEE RICH 6/1/23       History of present illness    63-year-old gentleman presented clinic today for follow-up valuation status post right total knee replacement status post right knee manipulation under anesthesia.  Overall seems to be improving in regards to function however he continues to have flareups of the right knee after his activity.  He seems to be overdoing it quite a bit.  He reports playing basketball last week and having an acute flare of the right knee which lasted a few days.  He continues to ice continues to do his home exercise program and stretching.  He still complains of tightness over the right knee mostly over the lateral aspect.  He has occasional patellofemoral symptoms.  No instability reported.  No locking catching.      Past medical , Surgical, Family and social history reviewed.      Physical exam  General: No acute distress and breathing comfortably.  Patient is pleasant and cooperative with the examination.    Extremity  Right knee is neurovascular intact.  Range of motion 0 to 95 degrees.  Moderate edema right knee.  Tenderness palpation IT band.  No joint line tenderness.  Mild discomfort over the patellofemoral region.  No calf swelling or tenderness.  Compartment soft.  Capillary refill within normal is distally.    Diagnostics  [ none]  No results found.     Procedure  [ none]    Assessment  Status post right total knee replacement    Treatment plan  1.  Continue with weightbearing activity as tolerated  2.  Prescription Medrol Dosepak sent to pharmacy.  We discussed myofascial massage techniques  3.  Follow-up with us in 3 months at his normally scheduled appointment with new x-rays right knee at that time.  4.  All of the patient's questions were answered.    This note was prepared using voice recognition software.  The details of this note are  correct and have been reviewed, and corrected to the best of my ability.  Some grammatical areas may persist related to the Dragon software    Aakash Bhandari PA-C, [facility]  [facility] [place]    [phone]

## 2024-03-06 NOTE — CONSULTS
Service:   Service: General Internal Medicine     Consult:  Consult requested by (Attending Name): Napoleon Frazier   Reason: Hospitalist/Teaching Service or PCP for Medical  Management - HTN     History of Present Illness:   Admission Reason: Right Knee Arthoplasty   HPI:    YUNIOR PALOMO is a 62 year old  Male, presents to the Orthopedics team with increased pain and decreased ability to perform ADLS due to right knee OA. After failed  conservative treatment, patient underwent surgery with no immediate post op complications, reports minimal pain to site described as dull in nature, mild in severity, responding well to pain meds. No other complaints. Hospitalist services were consulted  for management of the patient's medical conditions post/op.  Patient examined and seen, resting comfortably. Denies chest pain, shortness of breath, abdominal pain, dizziness, fever or chills. Currently patient vital signs are stable. Plan of care was  discussed with patient, verbalized understanding through teach back method. Hospitalist team will continue to follow until discharge.    Hospitalist team consulted for  post operative management of HTN, DM.     Past medical history: Left knee replacement, heart ablation x2, left venous ablation, left shoulder repair, cholecystectomy, heart cath, dental implants, hypertension, dysrhythmia, atrial fibrillation, diabetes 2, IBS, PAD, OA    Social history: Denies drug use, occasional alcohol use, denies tobacco use    Family history: Reviewed noncontributory to admission    Review of systems: 10 system were reviewed and were negative except what was mentioned in history of present illness             Allergies:  ·  No Known Allergies :     Objective:     Objective Information:        T   P  R  BP   MAP  SpO2   Value  36.4  55  18  172/84      97%  Date/Time 4/6 14:32 4/6 14:32 4/6 14:32 4/6 14:32    4/6 14:32  Range  (36.4C - 36.4C )  (55 - 55 )  (18 - 18 )  (172 - 172 )/ (84 -  84 )    (97% - 97% )    Physical Exam Narrative:  ·  Physical Exam:    Constitutional: Well developed, awake/alert/oriented x3, cooperative  Eyes: PERRL,  clear sclera  ENMT: mucous membranes moist, no apparent injury, no lesions seen  Head/Neck: Neck supple, no apparent injury,  Respiratory/Thorax: Patent airways,  normal breath sounds with good chest expansion, thorax symmetric  Cardiovascular: Regular, rate and rhythm, no murmurs, normal S 1and S 2  Gastrointestinal: Nondistended, soft, non-tender,   Musculoskeletal: mild decrease range of motion to right knee s/p sx intervention, good capillary refills bilaterally, +2 pulses, good sensation   Extremities: normal extremities,  incision covered with splint, immobilizer  Skin: warm, dry, intact  Neurological: alert/oriented x 3, speech clear  Psychiatric: appropriate mood and behavior      Medications:    Medications:      ANTI-INFECTIVES:    1. ceFAZolin 2 gram/ D5W 100 mL Premix IVPB:  100  mL  IntraVenous Piggyback  Every 6 Hours      CARDIOVASCULAR AGENTS:    1. Carvedilol:  6.25  mg  Oral  2 Times a Day      CENTRAL NERVOUS SYSTEM AGENTS:    1. Acetaminophen:  650  mg  Oral  Every 6 Hours    2. Morphine Injectable:  2  mg  IntraVenous Push  Every 4 Hours   PRN       3. oxyCODONE Immediate Release:  2.5  mg  Oral  Every 4 Hours   PRN       4. oxyCODONE Immediate Release:  5  mg  Oral  Every 4 Hours   PRN       5. oxyCODONE Immediate Release:  5  mg  Oral  Once    6. oxyCODONE Immediate Release:  7.5  mg  Oral  Every 4 Hours   PRN       7. Ondansetron Injectable:  4  mg  IntraVenous Push  Every 6 Hours   PRN       8. Cyclobenzaprine:  10  mg  Oral  3 Times a Day   PRN         COAGULATION MODIFIERS:    1. Tranexamic Acid:  1950  mg  Oral  Once      GASTROINTESTINAL AGENTS:    1. Magnesium Hydroxide -Al Hydrox -Simethicone Oral Liquid:  30  mL  Oral  Every 6 Hours   PRN       2. Docusate:  100  mg  Oral  2 Times a Day    3. Polyethylene Glycol:  17  gram(s)  Oral   2 Times a Day    4. Pantoprazole:  40  mg  Oral  Daily      METABOLIC AGENTS:    1. Insulin Lispro Moderate Corrective Scale:  unit(s)  SubCutaneous  4 Times a Day Insulin Timing    2. Allopurinol:  100  mg  Oral  Every 24 Hours    3. Dextrose 50% in Water Injectable:  25  gram(s)  IntraVenous Push  Every 15 Minutes   PRN       4. Glucagon Injectable:  1  mg  IntraMuscular  Every 15 Minutes   PRN         NUTRITIONAL PRODUCTS:    1. Lactated Ringers Infusion:  1000  mL  IntraVenous  <Continuous>    2. Lactated Ringers Infusion:  1000  mL  IntraVenous  <Continuous>      RESPIRATORY AGENTS:    1. diphenhydrAMINE Injectable:  12.5  mg  IntraVenous Push  Every 6 Hours   PRN       2. Albuterol 90 micrograms/ Inhalation MDI:  2  inhalation  Inhalation  Every 6 Hours   PRN         Recent Lab Results:    Results:        I have reviewed these laboratory results:    Glucose_POCT  Trending View      Result 06-Apr-2023 16:15:00  06-Apr-2023 13:14:00  06-Apr-2023 09:29:00    Glucose-POCT 165   H   145   H   137   H        Coronavirus 2019, Screen Asymptomatic  04-Apr-2023 14:54:00      Result Value    Fluid Source  Nasal, Nasopharyngeal    Coronavirus 2019,PCR  NOT DETECTED  Reference Range: Not Detected .This assay is designed to detect the ORF1a/b and E genes of SARS-CoV-2 via nucleic acid amplification. A Not Detected  result does not preclude 2019-nCoV infection since the adequacy of sample collec          Assessment:    Hospitalist team consulted for postoperative management of hypertension and diabetes.    # Right Knee Arthoplasty  Right Knee Pain  Orthopedic Team Primary   Pain and DVT Prophylaxis per Ortho team  PT/OT treatment evaluation  Fall precautions   Incentive spirometer education and demonstration addressed   Discharge planning  CBC BMP in a.m.  Vital signs every 8    # HTN / HLD / PAF / Diastolic Heart Failure with LVEF 64%  Continue home medications   Hold Lisinopril until discharge  Continue Statin   Continue  Coreg  Telemetry monitoring for arrhythmia  Xeralto - defer to Orthopedics Team     # GOUT  continue allopurinol    # Diabetes Mellitus   Accu Checks with SSI   Diabetic/Cardiac diet  Healthy Lifestyle encouraged    Thank you for consult  MEDICINE WILL FOLLOW  Telemetry Monitoring  Hemodynamically stable    Plan of care was discussed extensively with patient, RN and Ortho NP. Patient verbalized understanding through teach back method. All questions and concerns addressed upon examination.     ***Of note, this documentation is completed using the Dragon Dictation system (voice recognition software). There may be spelling and/or grammatical errors that were not corrected prior to final submission.***             Plan of Care Reviewed With:  Plan of Care Reviewed With: patient     Consult Status:  Consult Order ID: [id number]       Electronic Signatures:  Lois Cullen (APRN-CNP)  (Signed 06-Apr-2023 16:29)   Authored: Service, History of Present Illness, Allergies,  Objective, Assessment/Recommendations, Note Completion      Last Updated: 06-Apr-2023 16:29 by Lois Cullen (APRN-CNP)

## 2024-04-02 ENCOUNTER — APPOINTMENT (OUTPATIENT)
Dept: ORTHOPEDIC SURGERY | Facility: CLINIC | Age: 64
End: 2024-04-02
Payer: MEDICARE

## 2024-06-18 ENCOUNTER — APPOINTMENT (OUTPATIENT)
Dept: ORTHOPEDIC SURGERY | Facility: CLINIC | Age: 64
End: 2024-06-18
Payer: MEDICARE

## 2024-10-21 RX ORDER — AMLODIPINE BESYLATE 10 MG/1
10 TABLET ORAL DAILY
COMMUNITY